# Patient Record
Sex: MALE | Race: WHITE
[De-identification: names, ages, dates, MRNs, and addresses within clinical notes are randomized per-mention and may not be internally consistent; named-entity substitution may affect disease eponyms.]

---

## 2019-02-20 ENCOUNTER — HOSPITAL ENCOUNTER (INPATIENT)
Dept: HOSPITAL 19 - INPTSU | Age: 69
LOS: 17 days | Discharge: HOME | DRG: 708 | End: 2019-03-09
Attending: UROLOGY | Admitting: UROLOGY
Payer: MEDICARE

## 2019-02-20 VITALS — BODY MASS INDEX: 36.22 KG/M2 | WEIGHT: 282.19 LBS | HEIGHT: 74 IN

## 2019-02-20 DIAGNOSIS — C61: Primary | ICD-10-CM

## 2019-02-20 DIAGNOSIS — Z88.0: ICD-10-CM

## 2019-02-20 PROCEDURE — A4314 CATH W/DRAINAGE 2-WAY LATEX: HCPCS

## 2019-02-20 PROCEDURE — A9284 NON-ELECTRONIC SPIROMETER: HCPCS

## 2019-03-08 VITALS — TEMPERATURE: 97.5 F | DIASTOLIC BLOOD PRESSURE: 66 MMHG | SYSTOLIC BLOOD PRESSURE: 128 MMHG | HEART RATE: 79 BPM

## 2019-03-08 VITALS — HEART RATE: 67 BPM | TEMPERATURE: 97.8 F | SYSTOLIC BLOOD PRESSURE: 114 MMHG | DIASTOLIC BLOOD PRESSURE: 57 MMHG

## 2019-03-08 VITALS — HEART RATE: 63 BPM | DIASTOLIC BLOOD PRESSURE: 65 MMHG | SYSTOLIC BLOOD PRESSURE: 124 MMHG

## 2019-03-08 VITALS — DIASTOLIC BLOOD PRESSURE: 84 MMHG | HEART RATE: 80 BPM | SYSTOLIC BLOOD PRESSURE: 112 MMHG

## 2019-03-08 VITALS — TEMPERATURE: 97.3 F | SYSTOLIC BLOOD PRESSURE: 142 MMHG | HEART RATE: 70 BPM | DIASTOLIC BLOOD PRESSURE: 74 MMHG

## 2019-03-08 VITALS — DIASTOLIC BLOOD PRESSURE: 61 MMHG | SYSTOLIC BLOOD PRESSURE: 123 MMHG | HEART RATE: 64 BPM

## 2019-03-08 VITALS — HEART RATE: 79 BPM | DIASTOLIC BLOOD PRESSURE: 66 MMHG | SYSTOLIC BLOOD PRESSURE: 128 MMHG

## 2019-03-08 VITALS — DIASTOLIC BLOOD PRESSURE: 60 MMHG | TEMPERATURE: 97.5 F | HEART RATE: 76 BPM | SYSTOLIC BLOOD PRESSURE: 118 MMHG

## 2019-03-08 VITALS — HEART RATE: 70 BPM | TEMPERATURE: 97.3 F | SYSTOLIC BLOOD PRESSURE: 142 MMHG | DIASTOLIC BLOOD PRESSURE: 74 MMHG

## 2019-03-08 VITALS — SYSTOLIC BLOOD PRESSURE: 126 MMHG | HEART RATE: 82 BPM | DIASTOLIC BLOOD PRESSURE: 66 MMHG

## 2019-03-08 VITALS — SYSTOLIC BLOOD PRESSURE: 119 MMHG | HEART RATE: 59 BPM | DIASTOLIC BLOOD PRESSURE: 61 MMHG

## 2019-03-08 PROCEDURE — 8E0W4CZ ROBOTIC ASSISTED PROCEDURE OF TRUNK REGION, PERCUTANEOUS ENDOSCOPIC APPROACH: ICD-10-PCS | Performed by: UROLOGY

## 2019-03-08 PROCEDURE — 07BC4ZX EXCISION OF PELVIS LYMPHATIC, PERCUTANEOUS ENDOSCOPIC APPROACH, DIAGNOSTIC: ICD-10-PCS | Performed by: UROLOGY

## 2019-03-08 PROCEDURE — 0VT04ZZ RESECTION OF PROSTATE, PERCUTANEOUS ENDOSCOPIC APPROACH: ICD-10-PCS | Performed by: UROLOGY

## 2019-03-08 PROCEDURE — 0T9B40Z DRAINAGE OF BLADDER WITH DRAINAGE DEVICE, PERCUTANEOUS ENDOSCOPIC APPROACH: ICD-10-PCS | Performed by: UROLOGY

## 2019-03-08 NOTE — NUR
Completed assessment and medication administration; PT tolerated all cares and
medications well; PT AMB in halls with SBA; PT denied pain or discomfort at
time of assessment; PT able to verbally communicate concerns; A&Ox3, CTAB, BS
active x4; MANNY drain in placed without obstruction; weaver in place without
obstruction draining yellow drainage; PT continues ABX therapy as ordered; No
further needs or concerns at time of assessment; PT in comfortable position in
bed; Call light placed within reach; Will continue to monitor.  CDA

## 2019-03-08 NOTE — NUR
PATIENT ADMITED INTO ROOM 342 POST OP ROBOTIC PROSTATE. ORIENTED BUT DROWSY.
VSS. NO C/O PAIN. ABDOMIN IS DISTENDED AND WITH POSITIVE BOWL SOUNDS. NO C/O
N/V. ABDOMINAL LAP SITES X5 CD&I WITH SWIFTSET. MANNY DRAIN TO COMPRESSION WITH
SCAN AMOUNTS OF BLOODY DRAINAGE NOTED. MEDINA TO DEPENDENT DRAINAGE WITH SMALL
AMOUNTS OF BLOODY DRAINAGE. HEAD TO TOE ASSESSMENT WNL. FAMILY AT BEDSIDE.
ORIENTED TO ROOM. CALL LIGHT IN REACH.

## 2019-03-08 NOTE — NUR
PATIENT AMBULATED IN HALLWAY TO NURSES STATION AND BACK TO ROOM. TOLERATED
ACTIVITY WELL. NO COMPLAINTS. PATIENT RESTING UP IN BEDSIDE CHAIR WITH CHICKEN
BROTH AND JELLO. NO OTHER NEEDS.

## 2019-03-09 VITALS — SYSTOLIC BLOOD PRESSURE: 128 MMHG | HEART RATE: 68 BPM | DIASTOLIC BLOOD PRESSURE: 66 MMHG | TEMPERATURE: 97.8 F

## 2019-03-09 VITALS — DIASTOLIC BLOOD PRESSURE: 61 MMHG | TEMPERATURE: 98.3 F | SYSTOLIC BLOOD PRESSURE: 119 MMHG | HEART RATE: 67 BPM

## 2019-03-09 VITALS — TEMPERATURE: 97.8 F | SYSTOLIC BLOOD PRESSURE: 120 MMHG | DIASTOLIC BLOOD PRESSURE: 60 MMHG | HEART RATE: 67 BPM

## 2019-03-09 VITALS — SYSTOLIC BLOOD PRESSURE: 126 MMHG | TEMPERATURE: 97.9 F | HEART RATE: 64 BPM | DIASTOLIC BLOOD PRESSURE: 61 MMHG

## 2019-03-09 VITALS — TEMPERATURE: 97.6 F | SYSTOLIC BLOOD PRESSURE: 138 MMHG | HEART RATE: 59 BPM | DIASTOLIC BLOOD PRESSURE: 71 MMHG

## 2019-03-09 LAB
ANION GAP SERPL CALC-SCNC: 7 MMOL/L (ref 7–16)
BASOPHILS # BLD: 0 10*3/UL (ref 0–0.2)
BASOPHILS NFR BLD AUTO: 0.3 % (ref 0–2)
BUN SERPL-MCNC: 14 MG/DL (ref 9–20)
CALCIUM SERPL-MCNC: 8.7 MG/DL (ref 8.4–10.2)
CHLORIDE SERPL-SCNC: 106 MMOL/L (ref 98–107)
CO2 SERPL-SCNC: 27 MMOL/L (ref 22–30)
CREAT SERPL-SCNC: 1.06 MG/DL (ref 0.66–1.25)
EOSINOPHIL # BLD: 0 10*3/UL (ref 0–0.7)
EOSINOPHIL NFR BLD: 0.2 % (ref 0–4)
ERYTHROCYTE [DISTWIDTH] IN BLOOD BY AUTOMATED COUNT: 13.2 % (ref 11.5–14.5)
GLUCOSE SERPL-MCNC: 129 MG/DL (ref 74–106)
GRANULOCYTES # BLD AUTO: 74.7 % (ref 42.2–75.2)
HCT VFR BLD AUTO: 38.3 % (ref 42–52)
HGB BLD-MCNC: 12.3 G/DL (ref 13.5–18)
LYMPHOCYTES # BLD: 1.7 10*3/UL (ref 1.2–3.4)
LYMPHOCYTES NFR BLD: 13.1 % (ref 20–51)
MCH RBC QN AUTO: 30 PG (ref 27–31)
MCHC RBC AUTO-ENTMCNC: 32 G/DL (ref 33–37)
MCV RBC AUTO: 95 FL (ref 80–100)
MONOCYTES # BLD: 1.5 10*3/UL (ref 0.1–0.6)
MONOCYTES NFR BLD AUTO: 11.3 % (ref 1.7–9.3)
NEUTROPHILS # BLD: 9.6 10*3/UL (ref 1.4–6.5)
PLATELET # BLD AUTO: 181 K/MM3 (ref 130–400)
PMV BLD AUTO: 10.4 FL (ref 7.4–10.4)
POTASSIUM SERPL-SCNC: 4.2 MMOL/L (ref 3.4–5)
RBC # BLD AUTO: 4.04 M/MM3 (ref 4.2–5.6)
SODIUM SERPL-SCNC: 139 MMOL/L (ref 137–145)

## 2019-03-09 NOTE — NUR
Completed PT discharge education and instruction.  PT and spouse reported
unsderstanding; provided post home care supplies for weaver and MANNY wound area
removal. PT escorted to personal vehicle by surgical staff via W/C.   CDA

## 2019-03-09 NOTE — NUR
Plan: Return home with wife Meg [phone].
 
Assess: Patient reports that he currently resides in  with his spouse.
Patient rpeorts that he does not use any DME and does not need any home health
care or additional support at home. Yovany shares his PCP as Dr. FONSECA. Pt
indicated that he obtains his RX from [facility] in  of of [address]. Can
transport home.
 
Action: No additional needs identified.

## 2019-03-09 NOTE — NUR
Patient resting in bed at this time. Patient is alert and oriented, answers
questions appropriately. Patient rouses easily. León remains in place
draining clear yellow urine. IVF continue, will INT per order after breakfast
if patient remains nausea free. Assisted patient to bedside recliner, patient
is steady with transfer and abulation, SBA provided to assist with equipment.
Assisted with breakfast order. Patient denies pain or further needs at this
time, call light within reach.

## 2019-03-09 NOTE — NUR
PT resting in bed with weaver draining without obstructions; red tinged clear
urine; 5 of 6 LAP sites visible to ABD; MANNY bulb drain in placed with bloody
drainage to 10cc or less with full suction inplace; IV LR running at 125ml/hr
to LH; No further assessed concerns or pain at time of rounds; Call light
within reach; Will continue to monitor.  CDA

## 2019-03-09 NOTE — NUR
Patient up to bedside chair most of the day. Urine remains clear and pale
yellow. Pain is well controlled with scheduled medication. Scant drainage out
of MANNY during the shift, will remove per order. Patient showered, denies
further needs at this time, call light within reach.